# Patient Record
Sex: FEMALE | Race: BLACK OR AFRICAN AMERICAN | Employment: UNEMPLOYED | URBAN - METROPOLITAN AREA
[De-identification: names, ages, dates, MRNs, and addresses within clinical notes are randomized per-mention and may not be internally consistent; named-entity substitution may affect disease eponyms.]

---

## 2020-01-27 ENCOUNTER — MEDICAL CORRESPONDENCE (OUTPATIENT)
Dept: HEALTH INFORMATION MANAGEMENT | Facility: CLINIC | Age: 25
End: 2020-01-27

## 2020-01-27 ENCOUNTER — HOSPITAL ENCOUNTER (INPATIENT)
Facility: CLINIC | Age: 25
LOS: 3 days | Discharge: HOME HEALTH | End: 2020-01-30
Attending: OBSTETRICS & GYNECOLOGY | Admitting: OBSTETRICS & GYNECOLOGY
Payer: COMMERCIAL

## 2020-01-27 PROBLEM — Z36.89 ENCOUNTER FOR TRIAGE IN PREGNANT PATIENT: Status: ACTIVE | Noted: 2020-01-27

## 2020-01-27 LAB
ABO + RH BLD: NORMAL
ABO + RH BLD: NORMAL
ALBUMIN UR-MCNC: 10 MG/DL
APPEARANCE UR: ABNORMAL
BACTERIA #/AREA URNS HPF: ABNORMAL /HPF
BASOPHILS # BLD AUTO: 0 10E9/L (ref 0–0.2)
BASOPHILS NFR BLD AUTO: 0.1 %
BILIRUB UR QL STRIP: NEGATIVE
BLD GP AB SCN SERPL QL: NORMAL
BLOOD BANK CMNT PATIENT-IMP: NORMAL
COLOR UR AUTO: YELLOW
CREAT UR-MCNC: 98 MG/DL
DIFFERENTIAL METHOD BLD: ABNORMAL
EOSINOPHIL # BLD AUTO: 0.2 10E9/L (ref 0–0.7)
EOSINOPHIL NFR BLD AUTO: 1.1 %
ERYTHROCYTE [DISTWIDTH] IN BLOOD BY AUTOMATED COUNT: 12.5 % (ref 10–15)
GLUCOSE UR STRIP-MCNC: NEGATIVE MG/DL
HCT VFR BLD AUTO: 34 % (ref 35–47)
HGB BLD-MCNC: 11.7 G/DL (ref 11.7–15.7)
HGB UR QL STRIP: ABNORMAL
IMM GRANULOCYTES # BLD: 0.1 10E9/L (ref 0–0.4)
IMM GRANULOCYTES NFR BLD: 0.7 %
KETONES UR STRIP-MCNC: NEGATIVE MG/DL
LEUKOCYTE ESTERASE UR QL STRIP: ABNORMAL
LYMPHOCYTES # BLD AUTO: 1.7 10E9/L (ref 0.8–5.3)
LYMPHOCYTES NFR BLD AUTO: 12.4 %
MCH RBC QN AUTO: 34.1 PG (ref 26.5–33)
MCHC RBC AUTO-ENTMCNC: 34.4 G/DL (ref 31.5–36.5)
MCV RBC AUTO: 99 FL (ref 78–100)
MONOCYTES # BLD AUTO: 1.2 10E9/L (ref 0–1.3)
MONOCYTES NFR BLD AUTO: 8.4 %
MUCOUS THREADS #/AREA URNS LPF: PRESENT /LPF
NEUTROPHILS # BLD AUTO: 10.7 10E9/L (ref 1.6–8.3)
NEUTROPHILS NFR BLD AUTO: 77.3 %
NITRATE UR QL: NEGATIVE
NRBC # BLD AUTO: 0 10*3/UL
NRBC BLD AUTO-RTO: 0 /100
PH UR STRIP: 6.5 PH (ref 5–7)
PLATELET # BLD AUTO: 242 10E9/L (ref 150–450)
RBC # BLD AUTO: 3.43 10E12/L (ref 3.8–5.2)
RBC #/AREA URNS AUTO: 3 /HPF (ref 0–2)
RUPTURE OF FETAL MEMBRANES BY ROM PLUS: POSITIVE
SOURCE: ABNORMAL
SP GR UR STRIP: 1.01 (ref 1–1.03)
SPECIMEN EXP DATE BLD: NORMAL
SPECIMEN SOURCE: ABNORMAL
SQUAMOUS #/AREA URNS AUTO: 20 /HPF (ref 0–1)
UROBILINOGEN UR STRIP-MCNC: NORMAL MG/DL (ref 0–2)
WBC # BLD AUTO: 13.9 10E9/L (ref 4–11)
WBC #/AREA URNS AUTO: 10 /HPF (ref 0–5)
WET PREP SPEC: ABNORMAL

## 2020-01-27 PROCEDURE — 86780 TREPONEMA PALLIDUM: CPT | Performed by: STUDENT IN AN ORGANIZED HEALTH CARE EDUCATION/TRAINING PROGRAM

## 2020-01-27 PROCEDURE — 87491 CHLMYD TRACH DNA AMP PROBE: CPT | Performed by: STUDENT IN AN ORGANIZED HEALTH CARE EDUCATION/TRAINING PROGRAM

## 2020-01-27 PROCEDURE — 80349 CANNABINOIDS NATURAL: CPT | Performed by: OBSTETRICS & GYNECOLOGY

## 2020-01-27 PROCEDURE — G0463 HOSPITAL OUTPT CLINIC VISIT: HCPCS

## 2020-01-27 PROCEDURE — 87086 URINE CULTURE/COLONY COUNT: CPT | Performed by: OBSTETRICS & GYNECOLOGY

## 2020-01-27 PROCEDURE — 86901 BLOOD TYPING SEROLOGIC RH(D): CPT | Performed by: STUDENT IN AN ORGANIZED HEALTH CARE EDUCATION/TRAINING PROGRAM

## 2020-01-27 PROCEDURE — G0499 HEPB SCREEN HIGH RISK INDIV: HCPCS | Performed by: STUDENT IN AN ORGANIZED HEALTH CARE EDUCATION/TRAINING PROGRAM

## 2020-01-27 PROCEDURE — 87389 HIV-1 AG W/HIV-1&-2 AB AG IA: CPT | Performed by: STUDENT IN AN ORGANIZED HEALTH CARE EDUCATION/TRAINING PROGRAM

## 2020-01-27 PROCEDURE — 84112 EVAL AMNIOTIC FLUID PROTEIN: CPT | Performed by: OBSTETRICS & GYNECOLOGY

## 2020-01-27 PROCEDURE — 25800030 ZZH RX IP 258 OP 636: Performed by: STUDENT IN AN ORGANIZED HEALTH CARE EDUCATION/TRAINING PROGRAM

## 2020-01-27 PROCEDURE — 87653 STREP B DNA AMP PROBE: CPT | Performed by: STUDENT IN AN ORGANIZED HEALTH CARE EDUCATION/TRAINING PROGRAM

## 2020-01-27 PROCEDURE — 25000128 H RX IP 250 OP 636: Performed by: STUDENT IN AN ORGANIZED HEALTH CARE EDUCATION/TRAINING PROGRAM

## 2020-01-27 PROCEDURE — 81001 URINALYSIS AUTO W/SCOPE: CPT | Performed by: OBSTETRICS & GYNECOLOGY

## 2020-01-27 PROCEDURE — 87210 SMEAR WET MOUNT SALINE/INK: CPT | Performed by: STUDENT IN AN ORGANIZED HEALTH CARE EDUCATION/TRAINING PROGRAM

## 2020-01-27 PROCEDURE — 86900 BLOOD TYPING SEROLOGIC ABO: CPT | Performed by: STUDENT IN AN ORGANIZED HEALTH CARE EDUCATION/TRAINING PROGRAM

## 2020-01-27 PROCEDURE — 80307 DRUG TEST PRSMV CHEM ANLYZR: CPT | Performed by: OBSTETRICS & GYNECOLOGY

## 2020-01-27 PROCEDURE — G0463 HOSPITAL OUTPT CLINIC VISIT: HCPCS | Mod: 25

## 2020-01-27 PROCEDURE — 12000001 ZZH R&B MED SURG/OB UMMC

## 2020-01-27 PROCEDURE — 85025 COMPLETE CBC W/AUTO DIFF WBC: CPT | Performed by: STUDENT IN AN ORGANIZED HEALTH CARE EDUCATION/TRAINING PROGRAM

## 2020-01-27 PROCEDURE — 86850 RBC ANTIBODY SCREEN: CPT | Performed by: STUDENT IN AN ORGANIZED HEALTH CARE EDUCATION/TRAINING PROGRAM

## 2020-01-27 PROCEDURE — 87591 N.GONORRHOEAE DNA AMP PROB: CPT | Performed by: STUDENT IN AN ORGANIZED HEALTH CARE EDUCATION/TRAINING PROGRAM

## 2020-01-27 RX ORDER — ACETAMINOPHEN 325 MG/1
650 TABLET ORAL EVERY 4 HOURS PRN
Status: DISCONTINUED | OUTPATIENT
Start: 2020-01-27 | End: 2020-01-28

## 2020-01-27 RX ORDER — PROCHLORPERAZINE 25 MG
25 SUPPOSITORY, RECTAL RECTAL EVERY 12 HOURS PRN
Status: DISCONTINUED | OUTPATIENT
Start: 2020-01-27 | End: 2020-01-28

## 2020-01-27 RX ORDER — OXYTOCIN/0.9 % SODIUM CHLORIDE 30/500 ML
100-340 PLASTIC BAG, INJECTION (ML) INTRAVENOUS CONTINUOUS PRN
Status: COMPLETED | OUTPATIENT
Start: 2020-01-27 | End: 2020-01-28

## 2020-01-27 RX ORDER — OXYTOCIN/0.9 % SODIUM CHLORIDE 30/500 ML
1-24 PLASTIC BAG, INJECTION (ML) INTRAVENOUS CONTINUOUS
Status: DISCONTINUED | OUTPATIENT
Start: 2020-01-27 | End: 2020-01-28

## 2020-01-27 RX ORDER — LIDOCAINE 40 MG/G
CREAM TOPICAL
Status: DISCONTINUED | OUTPATIENT
Start: 2020-01-27 | End: 2020-01-28

## 2020-01-27 RX ORDER — PRENATAL VIT/IRON FUM/FOLIC AC 27MG-0.8MG
1 TABLET ORAL DAILY
COMMUNITY

## 2020-01-27 RX ORDER — CITRIC ACID/SODIUM CITRATE 334-500MG
30 SOLUTION, ORAL ORAL ONCE
Status: DISCONTINUED | OUTPATIENT
Start: 2020-01-27 | End: 2020-01-28

## 2020-01-27 RX ORDER — PENICILLIN G POTASSIUM 5000000 [IU]/1
5 INJECTION, POWDER, FOR SOLUTION INTRAMUSCULAR; INTRAVENOUS ONCE
Status: COMPLETED | OUTPATIENT
Start: 2020-01-27 | End: 2020-01-27

## 2020-01-27 RX ORDER — SODIUM CHLORIDE, SODIUM LACTATE, POTASSIUM CHLORIDE, CALCIUM CHLORIDE 600; 310; 30; 20 MG/100ML; MG/100ML; MG/100ML; MG/100ML
INJECTION, SOLUTION INTRAVENOUS
Status: DISCONTINUED
Start: 2020-01-27 | End: 2020-01-28 | Stop reason: HOSPADM

## 2020-01-27 RX ORDER — CARBOPROST TROMETHAMINE 250 UG/ML
250 INJECTION, SOLUTION INTRAMUSCULAR
Status: DISCONTINUED | OUTPATIENT
Start: 2020-01-27 | End: 2020-01-28

## 2020-01-27 RX ORDER — OXYTOCIN 10 [USP'U]/ML
10 INJECTION, SOLUTION INTRAMUSCULAR; INTRAVENOUS
Status: DISCONTINUED | OUTPATIENT
Start: 2020-01-27 | End: 2020-01-28

## 2020-01-27 RX ORDER — NALOXONE HYDROCHLORIDE 0.4 MG/ML
.1-.4 INJECTION, SOLUTION INTRAMUSCULAR; INTRAVENOUS; SUBCUTANEOUS
Status: DISCONTINUED | OUTPATIENT
Start: 2020-01-27 | End: 2020-01-28

## 2020-01-27 RX ORDER — IBUPROFEN 800 MG/1
800 TABLET, FILM COATED ORAL
Status: DISCONTINUED | OUTPATIENT
Start: 2020-01-27 | End: 2020-01-28

## 2020-01-27 RX ORDER — SODIUM CHLORIDE, SODIUM LACTATE, POTASSIUM CHLORIDE, CALCIUM CHLORIDE 600; 310; 30; 20 MG/100ML; MG/100ML; MG/100ML; MG/100ML
INJECTION, SOLUTION INTRAVENOUS CONTINUOUS
Status: DISCONTINUED | OUTPATIENT
Start: 2020-01-27 | End: 2020-01-28

## 2020-01-27 RX ORDER — ONDANSETRON 2 MG/ML
4 INJECTION INTRAMUSCULAR; INTRAVENOUS EVERY 6 HOURS PRN
Status: DISCONTINUED | OUTPATIENT
Start: 2020-01-27 | End: 2020-01-28

## 2020-01-27 RX ORDER — OXYCODONE AND ACETAMINOPHEN 5; 325 MG/1; MG/1
1 TABLET ORAL
Status: DISCONTINUED | OUTPATIENT
Start: 2020-01-27 | End: 2020-01-28

## 2020-01-27 RX ORDER — FENTANYL CITRATE 50 UG/ML
50-100 INJECTION, SOLUTION INTRAMUSCULAR; INTRAVENOUS
Status: DISCONTINUED | OUTPATIENT
Start: 2020-01-27 | End: 2020-01-28

## 2020-01-27 RX ORDER — TERBUTALINE SULFATE 1 MG/ML
0.25 INJECTION, SOLUTION SUBCUTANEOUS
Status: DISCONTINUED | OUTPATIENT
Start: 2020-01-27 | End: 2020-01-28

## 2020-01-27 RX ORDER — METHYLERGONOVINE MALEATE 0.2 MG/ML
200 INJECTION INTRAVENOUS
Status: DISCONTINUED | OUTPATIENT
Start: 2020-01-27 | End: 2020-01-28

## 2020-01-27 RX ORDER — METOCLOPRAMIDE HYDROCHLORIDE 5 MG/ML
10 INJECTION INTRAMUSCULAR; INTRAVENOUS EVERY 6 HOURS PRN
Status: DISCONTINUED | OUTPATIENT
Start: 2020-01-27 | End: 2020-01-28

## 2020-01-27 RX ADMIN — SODIUM CHLORIDE, POTASSIUM CHLORIDE, SODIUM LACTATE AND CALCIUM CHLORIDE: 600; 310; 30; 20 INJECTION, SOLUTION INTRAVENOUS at 19:48

## 2020-01-27 RX ADMIN — PENICILLIN G POTASSIUM 5 MILLION UNITS: 5000000 POWDER, FOR SOLUTION INTRAMUSCULAR; INTRAPLEURAL; INTRATHECAL; INTRAVENOUS at 19:58

## 2020-01-27 ASSESSMENT — ACTIVITIES OF DAILY LIVING (ADL)
AMBULATION: 0-->INDEPENDENT
RETIRED_COMMUNICATION: 0-->UNDERSTANDS/COMMUNICATES WITHOUT DIFFICULTY
DRESS: 0-->INDEPENDENT
TOILETING: 0-->INDEPENDENT
RETIRED_EATING: 0-->INDEPENDENT
SWALLOWING: 0-->SWALLOWS FOODS/LIQUIDS WITHOUT DIFFICULTY
TRANSFERRING: 0-->INDEPENDENT
BATHING: 0-->INDEPENDENT
COGNITION: 0 - NO COGNITION ISSUES REPORTED
FALL_HISTORY_WITHIN_LAST_SIX_MONTHS: NO

## 2020-01-27 ASSESSMENT — MIFFLIN-ST. JEOR: SCORE: 1462.35

## 2020-01-28 LAB
BACTERIA SPEC CULT: NORMAL
C TRACH DNA SPEC QL NAA+PROBE: NEGATIVE
GLUCOSE BLDC GLUCOMTR-MCNC: 113 MG/DL (ref 70–99)
GLUCOSE BLDC GLUCOMTR-MCNC: 113 MG/DL (ref 70–99)
GP B STREP DNA SPEC QL NAA+PROBE: NEGATIVE
HBV SURFACE AG SERPL QL IA: NONREACTIVE
HIV 1+2 AB+HIV1 P24 AG SERPL QL IA: NONREACTIVE
Lab: NORMAL
N GONORRHOEA DNA SPEC QL NAA+PROBE: NEGATIVE
SPECIMEN SOURCE: NORMAL

## 2020-01-28 PROCEDURE — 25000132 ZZH RX MED GY IP 250 OP 250 PS 637: Performed by: STUDENT IN AN ORGANIZED HEALTH CARE EDUCATION/TRAINING PROGRAM

## 2020-01-28 PROCEDURE — 72200001 ZZH LABOR CARE VAGINAL DELIVERY SINGLE

## 2020-01-28 PROCEDURE — 88307 TISSUE EXAM BY PATHOLOGIST: CPT | Performed by: STUDENT IN AN ORGANIZED HEALTH CARE EDUCATION/TRAINING PROGRAM

## 2020-01-28 PROCEDURE — 12000001 ZZH R&B MED SURG/OB UMMC

## 2020-01-28 PROCEDURE — 25000128 H RX IP 250 OP 636: Performed by: STUDENT IN AN ORGANIZED HEALTH CARE EDUCATION/TRAINING PROGRAM

## 2020-01-28 PROCEDURE — 00000146 ZZHCL STATISTIC GLUCOSE BY METER IP

## 2020-01-28 PROCEDURE — 25000132 ZZH RX MED GY IP 250 OP 250 PS 637

## 2020-01-28 PROCEDURE — 88307 TISSUE EXAM BY PATHOLOGIST: CPT | Mod: 26 | Performed by: STUDENT IN AN ORGANIZED HEALTH CARE EDUCATION/TRAINING PROGRAM

## 2020-01-28 PROCEDURE — 25000125 ZZHC RX 250: Performed by: STUDENT IN AN ORGANIZED HEALTH CARE EDUCATION/TRAINING PROGRAM

## 2020-01-28 RX ORDER — CARBOPROST TROMETHAMINE 250 UG/ML
250 INJECTION, SOLUTION INTRAMUSCULAR
Status: DISCONTINUED | OUTPATIENT
Start: 2020-01-28 | End: 2020-01-30 | Stop reason: HOSPADM

## 2020-01-28 RX ORDER — AMOXICILLIN 250 MG
2 CAPSULE ORAL 2 TIMES DAILY
Status: DISCONTINUED | OUTPATIENT
Start: 2020-01-28 | End: 2020-01-30 | Stop reason: HOSPADM

## 2020-01-28 RX ORDER — IBUPROFEN 600 MG/1
600 TABLET, FILM COATED ORAL EVERY 6 HOURS PRN
Qty: 60 TABLET | Refills: 0 | Status: SHIPPED | OUTPATIENT
Start: 2020-01-28

## 2020-01-28 RX ORDER — LIDOCAINE HYDROCHLORIDE 10 MG/ML
INJECTION, SOLUTION EPIDURAL; INFILTRATION; INTRACAUDAL; PERINEURAL
Status: DISCONTINUED
Start: 2020-01-28 | End: 2020-01-28 | Stop reason: WASHOUT

## 2020-01-28 RX ORDER — ACETAMINOPHEN 325 MG/1
650 TABLET ORAL EVERY 6 HOURS PRN
Qty: 100 TABLET | Refills: 0 | Status: SHIPPED | OUTPATIENT
Start: 2020-01-28

## 2020-01-28 RX ORDER — ACETAMINOPHEN 325 MG/1
650 TABLET ORAL EVERY 4 HOURS PRN
Status: DISCONTINUED | OUTPATIENT
Start: 2020-01-28 | End: 2020-01-30 | Stop reason: HOSPADM

## 2020-01-28 RX ORDER — AMOXICILLIN 250 MG
1 CAPSULE ORAL DAILY
Qty: 100 TABLET | Refills: 0 | Status: SHIPPED | OUTPATIENT
Start: 2020-01-28

## 2020-01-28 RX ORDER — LANOLIN 100 %
OINTMENT (GRAM) TOPICAL
Status: DISCONTINUED | OUTPATIENT
Start: 2020-01-28 | End: 2020-01-30 | Stop reason: HOSPADM

## 2020-01-28 RX ORDER — IBUPROFEN 800 MG/1
800 TABLET, FILM COATED ORAL EVERY 6 HOURS PRN
Status: DISCONTINUED | OUTPATIENT
Start: 2020-01-28 | End: 2020-01-30 | Stop reason: HOSPADM

## 2020-01-28 RX ORDER — BISACODYL 10 MG
10 SUPPOSITORY, RECTAL RECTAL DAILY PRN
Status: DISCONTINUED | OUTPATIENT
Start: 2020-01-30 | End: 2020-01-30 | Stop reason: HOSPADM

## 2020-01-28 RX ORDER — OXYTOCIN 10 [USP'U]/ML
10 INJECTION, SOLUTION INTRAMUSCULAR; INTRAVENOUS
Status: DISCONTINUED | OUTPATIENT
Start: 2020-01-28 | End: 2020-01-30 | Stop reason: HOSPADM

## 2020-01-28 RX ORDER — CARBOPROST TROMETHAMINE 250 UG/ML
INJECTION, SOLUTION INTRAMUSCULAR
Status: DISCONTINUED
Start: 2020-01-28 | End: 2020-01-28 | Stop reason: WASHOUT

## 2020-01-28 RX ORDER — HYDROCORTISONE 2.5 %
CREAM (GRAM) TOPICAL 3 TIMES DAILY PRN
Status: DISCONTINUED | OUTPATIENT
Start: 2020-01-28 | End: 2020-01-30 | Stop reason: HOSPADM

## 2020-01-28 RX ORDER — OXYTOCIN 10 [USP'U]/ML
INJECTION, SOLUTION INTRAMUSCULAR; INTRAVENOUS
Status: DISCONTINUED
Start: 2020-01-28 | End: 2020-01-28 | Stop reason: WASHOUT

## 2020-01-28 RX ORDER — OXYTOCIN/0.9 % SODIUM CHLORIDE 30/500 ML
100 PLASTIC BAG, INJECTION (ML) INTRAVENOUS CONTINUOUS
Status: DISCONTINUED | OUTPATIENT
Start: 2020-01-28 | End: 2020-01-30 | Stop reason: HOSPADM

## 2020-01-28 RX ORDER — NALOXONE HYDROCHLORIDE 0.4 MG/ML
.1-.4 INJECTION, SOLUTION INTRAMUSCULAR; INTRAVENOUS; SUBCUTANEOUS
Status: DISCONTINUED | OUTPATIENT
Start: 2020-01-28 | End: 2020-01-30 | Stop reason: HOSPADM

## 2020-01-28 RX ORDER — AMOXICILLIN 250 MG
1 CAPSULE ORAL 2 TIMES DAILY
Status: DISCONTINUED | OUTPATIENT
Start: 2020-01-28 | End: 2020-01-30 | Stop reason: HOSPADM

## 2020-01-28 RX ORDER — MISOPROSTOL 200 UG/1
800 TABLET ORAL
Status: DISCONTINUED | OUTPATIENT
Start: 2020-01-28 | End: 2020-01-30 | Stop reason: HOSPADM

## 2020-01-28 RX ORDER — MISOPROSTOL 200 UG/1
TABLET ORAL
Status: COMPLETED
Start: 2020-01-28 | End: 2020-01-28

## 2020-01-28 RX ORDER — OXYTOCIN/0.9 % SODIUM CHLORIDE 30/500 ML
340 PLASTIC BAG, INJECTION (ML) INTRAVENOUS CONTINUOUS PRN
Status: DISCONTINUED | OUTPATIENT
Start: 2020-01-28 | End: 2020-01-30 | Stop reason: HOSPADM

## 2020-01-28 RX ORDER — OXYTOCIN/0.9 % SODIUM CHLORIDE 30/500 ML
PLASTIC BAG, INJECTION (ML) INTRAVENOUS
Status: DISCONTINUED
Start: 2020-01-28 | End: 2020-01-28 | Stop reason: WASHOUT

## 2020-01-28 RX ORDER — METHYLERGONOVINE MALEATE 0.2 MG/ML
200 INJECTION INTRAVENOUS
Status: COMPLETED | OUTPATIENT
Start: 2020-01-28 | End: 2020-01-28

## 2020-01-28 RX ADMIN — IBUPROFEN 800 MG: 800 TABLET, FILM COATED ORAL at 05:00

## 2020-01-28 RX ADMIN — SENNOSIDES AND DOCUSATE SODIUM 1 TABLET: 8.6; 5 TABLET ORAL at 21:12

## 2020-01-28 RX ADMIN — Medication 2.5 MILLION UNITS: at 00:26

## 2020-01-28 RX ADMIN — MISOPROSTOL 200 MCG: 200 TABLET ORAL at 01:45

## 2020-01-28 RX ADMIN — METHYLERGONOVINE MALEATE 400 MCG: 0.2 INJECTION INTRAMUSCULAR; INTRAVENOUS at 01:53

## 2020-01-28 RX ADMIN — Medication 2 MILLI-UNITS/MIN: at 01:09

## 2020-01-28 RX ADMIN — Medication 340 ML/HR: at 01:44

## 2020-01-28 RX ADMIN — IBUPROFEN 800 MG: 800 TABLET, FILM COATED ORAL at 21:21

## 2020-01-28 NOTE — PROVIDER NOTIFICATION
Pitocin, misoprostol and methergine given. FF and plan to increase fundal massages to q5-10 min per Dr. Sherwood.

## 2020-01-28 NOTE — DISCHARGE SUMMARY
[facility] Discharge Summary    Nelly Coyne MRN# [id number]   Age: 24 year old YOB: 1995     Date of Admission:  2020  Date of Discharge::  2020  Admitting Physician:  Mary Sherwood MD  Discharge Physician:  Jaki Carreon MD          Admission Diagnoses:   -IUP at 36w1d  -PPROM,  labor  -GDM status unknown, refused GCT          Discharge Diagnosis:     -IUP at 36w1d, now delivered  Positive hepatitis B surface antigen          Procedures:     Procedure(s): -            Medications Prior to Admission:     Medications Prior to Admission   Medication Sig Dispense Refill Last Dose     Prenatal Vit-Fe Fumarate-FA (PRENATAL MULTIVITAMIN W/IRON) 27-0.8 MG tablet Take 1 tablet by mouth daily   Past Month at Unknown time             Discharge Medications:        Review of your medicines      START taking      Dose / Directions   acetaminophen 325 MG tablet  Commonly known as:  TYLENOL      Dose:  650 mg  Take 2 tablets (650 mg) by mouth every 6 hours as needed for mild pain Start after Delivery.  Quantity:  100 tablet  Refills:  0     ibuprofen 600 MG tablet  Commonly known as:  ADVIL/MOTRIN      Dose:  600 mg  Take 1 tablet (600 mg) by mouth every 6 hours as needed for moderate pain Start after delivery  Quantity:  60 tablet  Refills:  0     senna-docusate 8.6-50 MG tablet  Commonly known as:  SENOKOT-S/PERICOLACE      Dose:  1 tablet  Take 1 tablet by mouth daily Start after delivery.  Quantity:  100 tablet  Refills:  0        CONTINUE these medicines which have NOT CHANGED      Dose / Directions   prenatal multivitamin w/iron 27-0.8 MG tablet      Dose:  1 tablet  Take 1 tablet by mouth daily  Refills:  0           Where to get your medicines      These medications were sent to [facility] [address]  [address] [address]    Phone:  [phone]     acetaminophen 325 MG tablet    ibuprofen 600 MG  tablet    senna-docusate 8.6-50 MG tablet               Consultations:     SW          Brief Admission History   Ms. Nelly Coyne is a 24 year old  at 36w0d by 11w1d US who presents with leaking fluid since  and contractions since today at 1500. Small amount of pink when wiping once today, otherwise no vaginal bleeding.  + normal fetal movement.         Brief Intrapartum Course:   Was found to have ruptured membranes and spontaneous  labor. Induction/augmentation was recommended. Labor was augmented with pitocin. For pain, she received nothing. She progressed with complete and with good effort delivered a female infant from MIGDALIA position. Shoulders delivered easily. No nuchal cord. Placed on mother's chest. Infant was vigorous, so cord was cut after 60 second delay. APGAR of 8 and 9 at 1 and 5 minutes. Weight 2500 g. Placenta delivered with gentle cord traction; noted to be intact with 3 vessel cord. No lacerations were present that required repair. Good hemostasis noted. Poor uterine tone was noted, for which she received IV pitocin, oral misoprostol, and IM methergine with good improvement in tone. Fundus firm and lochia minimal at the end of the case.  mL. Dr. Sherwood was present during entire delivery.            Hospital Course:   The patient's hospital course was unremarkable.  On discharge, her pain was well controlled. Vaginal bleeding is similar to peak menstrual flow.  Voiding without difficulty.  Ambulating well and tolerating a normal diet.  No fever.  Breastfeeding well.  Infant is stable.  She was discharged on post-partum day #1. Hep B surface antigen was reactive but repeat was negative. Hep B core antibody was negative.  Post-partum hemoglobin: 10.4          Discharge Instructions and Follow-Up:     Discharge diet: Regular   Discharge activity: Pelvic rest for 6 weeks including no sexual intercourse, tampons, or douching.    Discharge follow-up: Follow up with your primary  OB for a routine postpartum visit in 6 weeks           Discharge Disposition:     Discharged to home in stable condition     Amy Schumer, MD  Obstetrics & Gynecology, PGY-3  01/30/20      Appreciate note by Dr. Schumer. Patient has been seen and examined by me separate from the resident, agree with above note.     Jaki Carreon MD  10:41 AM

## 2020-01-28 NOTE — PLAN OF CARE
Data: Nelly Coyne transferred to [place] [address] via wheelchair at 0425. Baby transferred via parent's arms.  Action: Receiving unit notified of transfer: Yes. Patient and family notified of room change. Report given to YENIFER Lyman at 0415. Belongings sent to receiving unit. Accompanied by Registered Nurse. Oriented patient to surroundings. Call light within reach. ID bands double-checked with receiving RN.  Response: Patient tolerated transfer and is stable, up ad chanel and voiding independently.

## 2020-01-28 NOTE — PLAN OF CARE
of viable female  @ 0139. NICU present at delivery, GA 36 . APGARs 8,9 brought immediately skin to skin with mom for a few minutes before quick evaluation at warmer.     QBL 100mL with delivery of placenta. Clots expressed. Pitocin IV, misoprostol PO, and methergine IM given. No repair indicated. Continuing to monitor bleeding closely and provide frequent fundal massage.

## 2020-01-28 NOTE — PROVIDER NOTIFICATION
Head visible on perineum with pushing. Dr. Quinones at bedside and Dr. Sherwood and NICU team paged to attend delivery.

## 2020-01-28 NOTE — PLAN OF CARE
VSS, afebrile. Denies chills or abdominal tenderness outside of contractions. Leaking fluid remains clear and odorless. No vaginal bleeding noted. Confirms normal fetal movement. Patient reports contractions increasing in intensity and frequency, plans unmedicated birth at this time. Educated on options to support her comfort. Family and partner present at the bedside. Continue current cares, plan to begin induction around midnight and anticipate .

## 2020-01-28 NOTE — PLAN OF CARE
[facility] with complaint of contractions every 15 minutes and leaking since early Saturday morning, >60 hours ago. Pt is afebrile. Cervix is posterior, but 4/90/-2. Labs pending. Pt moved to Rm 477 after + ROM +. Antibiotics started for PPROM and unknown GBS status. Pt comfortable at this time. She states she would like to deliver without pain meds. Oriented to new room. Call light within reach.

## 2020-01-28 NOTE — L&D DELIVERY NOTE
OB Vaginal Delivery Note    Nelly Coyne MRN# [id number]   Age: 24 year old YOB: 1995     Patient is a 24 year old  at 36w1d by 11w1d US who presented for leaking fluid and contractions. Was found to have ruptured membranes and spontaneous  labor. Induction/augmentation was recommended. Labor was augmented with pitocin. For pain, she received no medications. She progressed to complete cervical dilation and with good effort delivered a liveborn female infant from MIGDALIA position. Shoulders delivered easily. No nuchal cord. Placed on mother's chest. Infant was vigorous, so cord was cut after 60 second delay. APGAR of 8 and 9 at 1 and 5 minutes. Weight 2500 g. Placenta delivered with gentle cord traction; noted to be intact with 3 vessel cord. No lacerations were present that required repair. Good hemostasis noted. Poor uterine tone was noted, for which she received IV pitocin, oral misoprostol, and IM methergine with good improvement in tone. Fundus firm and lochia minimal at the end of the case.  ml. Dr. Sherwood was present during entire delivery.     Isa Quinones MD  OB/GYN PGY-2  20 2:06 AM     I was scrubbed and present for the above , delivery of placenta and management of postdelivery uterine atony.    Mary Sherwood MD, FACOG      GA: 36w1d  GP:   Labor Complications: None   EBL:    mL  Delivery QBL:    Delivery Type: Vaginal, Spontaneous   ROM to Delivery Time: (Delivered) Days: 3 Minutes: 39   Weight:      1 Minute 5 Minute 10 Minute   Apgar Totals: 8    9                Delivery Details:  Nelly Coyne, a 24 year old  female delivered a viable infant with apgars of 8   and 9  . Patient was fully dilated and pushing after    hours    minutes in active labor. Delivery was via vaginal, spontaneous  to a sterile field under none  anesthesia. Infant delivered in vertex  right  occiput  anterior  position. Anterior and posterior shoulders delivered  without difficulty. The cord was clamped, cut twice and 3 vessels  were noted. Cord blood was obtained in routine fashion with the following disposition: lab .      Cord complications: none   Placenta delivered at   . Placental disposition was Pathology . Fundal massage performed and fundus found to be firm.     Episiotomy: none    Perineum, vagina, cervix were inspected, and the following lacerations were noted:   Perineal lacerations: none                       Excellent hemostasis was noted. Needle count correct. Infant and patient in delivery room in good and stable condition.        Labor Events     labor?:  Yes   steroids:  None  Labor Type:  Spontaneous  Predominate monitoring during 1st stage:  continuous electronic fetal monitoring     Antibiotics received during labor?:  Yes  Reason for Antibiotics:  GBS  Antibiotics received for GBS:  Penicillin  Antibiotics Given (GBS):  Greater than 4 hours prior to delivery     Rupture date/time: 20 0100   Rupture type:  Spontaneous rupture of membranes prior to induction  Fluid color:  Clear  Fluid odor:  Normal     Delivery/Placenta Date and Time    Delivery Date:  20 Delivery Time:   1:39 AM      Apgars    Living status:  Living   1 Minute 5 Minute 10 Minute 15 Minute 20 Minute   Skin color: 0  1       Heart rate: 2  2       Reflex irritability: 2  2       Muscle tone: 2  2       Respiratory effort: 2  2       Total: 8  9       Apgars assigned by:  AUGUSTIN TAM     Cord    Vessels:  3 Vessels Complications:  None   Cord Blood Disposition:  Lab Gases Sent?:  No      [place] Resuscitation    Methods:  Suctioning   Care at Delivery:  Called by Dr. Quinones to attend delivery secondary to 36 1/7 week gestation. Infant delivered at 0139 on 20 and cried at delivery. Cord clamping delayed x 1 minute, per routine. Infant then taken to warmer, dried, stimulated, and bulb suctioned. Apgars 8 at 1 minute, 9 at 5 minutes. Physical  exam significant for supernumerary digits (pinky fingers), bilaterally.    HERACLIO Perkins 1/28/2020 1:57 AM         Labor Events and Shoulder Dystocia    Fetal Tracing Prior to Delivery:  Category 1  Shoulder dystocia present?:  Neg     Delivery (Maternal) (Provider to Complete) ([id number])    Episiotomy:  None  Perineal lacerations:  None       Blood Loss  Mother: Nelly Coyne #[id number]   Start of Mother's Information    IO Blood Loss  01/27/20 1339 - 01/28/20 0208    None           End of Mother's Information  Mother: Nelly Coyne #[id number]         Delivery - Provider to Complete (224780)    Delivery Type (Choose the 1 that will go to the Birth History):  Vaginal, Spontaneous          Placenta    Delayed Cord Clamping:  Done  Removal:  Spontaneous  Disposition:  Pathology     Anesthesia    Method:  None          Presentation and Position    Presentation:  Vertex  Position:  Right Occiput Anterior           Isa Quinones MD

## 2020-01-28 NOTE — PROGRESS NOTES
"Labor progress note    S: Patient increasingly uncomfortable with contractions. Family in room and supportive.     O:  Patient Vitals for the past 24 hrs:   BP Temp Temp src Resp Height Weight   20 2300 -- 97.9  F (36.6  C) Oral -- -- --   20 2200 -- 98.2  F (36.8  C) Oral -- -- --   20 2030 -- 98.3  F (36.8  C) Oral 18 -- --   20 1753 116/61 98  F (36.7  C) Oral 20 -- --   20 1752 116/61 97.9  F (36.6  C) Oral 20 1.562 m (5' 1.5\") 76.2 kg (168 lb)     Gen: Uncomfortable with ctx  SVE: /-2    FHT baseline 145, mod arti, accels present, no decels  [place]: 1-2 contractions in 10 minutes     Assessment  Ms. Nelly Coyne is a 24 year old , at 36w0d by 11w1d US who presents with PPROM and latent labor.     Plan  Labor:   Anticipate , will augment with pitocin as SVE is unchanged and now adequate on GBS ppx  FWB:    Category I FHT.  Continue EFM and toco  Pain:     Desires nitrous or fentanyl for analgesia  PNC:     Rh pos, Rubella imm, GBS unknown, PCN adequate now, GCT not done, will check random BG, Placenta posterior    Isa Quinones MD  OB/GYN PGY-2  20 12:10 AM     "

## 2020-01-28 NOTE — PLAN OF CARE
Vital signs and assessment findings normal, though max temp of 99. Pt fundus firm, bleeding scant. Denies pain but agreed to ibuprofen. Pitocin saline locked at 0500. Breastfeeding with assist, though able to express good drops of colostrum. LC put in due to LPT status of baby. Patient arrived with mother and significant other and supported by significant other Demylo overnight. Initial room orientation complete. EDS given and encouraged to complete as well with birth certificate. Continue to monitor.

## 2020-01-28 NOTE — LACTATION NOTE
This note was copied from a baby's chart.  Consult for: First time mom breastfeeding, late  infant 5.5#.    History: Vaginal delivery @ 36w1d, AGA infant @ 5# 8.2 oz. birthweight, BG so far WNL, less than 24 hours at time of visit. Maternal history of fragmented prenatal care, refused GCT,  delivery after SROM.     Breast exam of mom: Soft, symmetric with intact, everted nipples bilaterally. Nelly reports early tenderness & bilateral breast growth during pregnancy.     Feeding assessment:  Attempted feeding this morning, infant sleepy and disinterested. Encouraged mom to attempt for 5 minutes or so but if sleepy, ok to just give supplement and began pumping.     Education provided: Discussed potential feeding challenges of and ways to support LPT infants including benefits of extra supplements, rationale for nipple shield use initially and importance of LC follow up outpatient. Reviewed positioning & using pillows/blankets for support, anatomy of breast and infant mouth for feedings, how to latch well using nipple shield & use breast compressions to enhance milk transfer. Encouraged frequent skin to skin and feeding on cue but no longer than 3 hours between, limit feedings to 15-20 minutes if actively feeding, attempts up to 5 minutes if she's sleepy and not interested. Reviewed supply and demand and importance of pumping to bring in full supply, benefits of and how to do breast massage & hands on pumping. Encouraged spoon or finger feeding for now, taught how to do paced bottle feeding with slow flow nipple for use when needed.     Plan:  Please encourage frequent skin to skin, feeding on cue but no longer than 3 hours between (goal of 8 to 12 total feeds in 24 hours). Encourage using nipple shield to maximize milk transfer at breast. Limit time at breast to 15-20 minutes, leaving time for pumping & help conserve baby's energy. Breast massage/compressions while feeding will help maximize milk  transfer. Supplement at least every 3 hours according to guidelines, feed to satiety. Hands on pumping &/or hand express after each feeding, goal of pumping at least 6-8 times in 24 hours to help maximize supply.      Plan for follow up with outpatient lactation consultant within a week of discharge for support with LPT infant, help to monitor infant weight and milk transfer, establish supply & eventually wean from pumping and nipple shield. Due to infant prematurity, recommend renting hospital grade breast pump at discharge to help bring in full milk supply.    Supplement Guidelines for infants <37 weeks gestation or < 6 lbs at birth per the [place] Fe[place] Methods for the  Infant (35-42 weeks) Policy ([facility] Guidelines):  Infants <37 weeks OR <6 pounds   Birth-24 hours of age: 5ml (1 tsp) every 2 - 3 hours, at least 8 times in 24 hours   24-48 hours of age: 10 ml (2 tsp) every 2 - 3 hours, at least 8 times in 24 hours   48-72 hours of age: 15 ml (3 tsp) every 2 - 3 hours, at least 8 times in 24 hours

## 2020-01-28 NOTE — PROVIDER NOTIFICATION
The following page was sent to Dr. Quinones.     FF and scant bleeding with last few checks. She is ready to eat something. Let me know if glucose monitoring plan will change postpartum. Thanks!    Page returned with plan to check fasting BG on PP day 1.

## 2020-01-28 NOTE — H&P
[facility]  OB History and Physical      Nelly Coyne MRN# [id number]   Age: 24 year old YOB: 1995     CC:  Leaking fluid, contraction     HPI:  Ms. Nelly Coyne is a 24 year old  at 36w0d by 11w1d US who presents with leaking fluid since  and contractions since today at 1500. Small amount of pink when wiping once today, otherwise no vaginal bleeding.  + normal fetal movement.    Pregnancy Complications:  - Fragmented prenatal care  - Refused GCT    Prenatal Labs:   Rh pos, Ab screen neg  UCx neg  8/15/19 pap NILM  Hgb 12.5, plt 296  RPR neg  GC/CT neg  HIV NR  HBsAg positive 8/15/19, then on recheck 10/16/19  Rubella imm    GBS Status:   Unknown    Ultrasounds  19 CONCLUSIONS:  Findings consistent with previous dating  EGA by LMP 19: 96cgc8l. TAMEKA:20  EGA by this U/S: 59hzi3u (+/- 7days): TAMEKA by this  U/S: 2/24/20    10/22/19   2nd/3rd Trimester Ultrasound Report    Date of Service: 10/22/2019     CONCLUSIONS:     EGA by LMP(20):22w1d   Estimated Date of Delivery: 20   EGA by this U/S: 30xxr8m (+/- 7days):   TAMEKA by this  U/S: 3/1/20              EFW 426g, +/- 64g, 0lb 15oz    Weight Percentile: 26.2%sona Gracia    PCP: Roxi Jennings MD  Sonographer: Anjana King    Indications: Fetal survey  LMP: Patient's last menstrual period was 2019 (within weeks).   G/P:     FHR: 154 BPM    QUINTIN: 12.67cm  Placenta Location: posterior, is not low lying.  Fetal number:1    Presentation: Cephalic  Technique: Transabdominal  Machine: [employer] P9    FETAL SURVEY                    Cerebellum                         Intracranial Anatomy           normal   (Choroid Plexus, Cisterna Magna,   Lateral ventricles, Cerebellum,   Midline Falx, Cavum Septi Pellucidi)        Heart: (4 chamber)    LT EIF  Heart:(out flow tracts)       normal  Diaphragm                             normal  Stomach                                  normal  Kidney: Right                       normal                 Left                         normal  Umbilical Cord: 3 vessels:   normal                              Insertion:   normal  Bladder                                  normal  Spine: Cervical                      normal              Thoracic                     normal               L/S                              normal  4 limbs visualized                  normal    Sex Determination: female--Gender reveal    MEASUREMENTS:([place])  BPD:  5.18 cm    21wks 5d    HC:    19.34 cm    21wks 4d     AC:    15.95 cm    21wks 0d   FL:     3.73 cm    21wks 6d               **Fetal anomalies may be present but not detected.**    Anjana King RDMS, RVT    21w 2d by measurements today consistent with 1st trimester dates. Complete fetal anatomic survey done including intracranial anatomy, cerebellum, 4 chamber heart, stomach, bladder, diaphragm, kidneys, cord insertion, 3 vessel cord, cervical, thoracic and lumbar spine, no abnormalities seen. Placenta is posterior and not low lying.      OB History  OB History    Para Term  AB Living   1 0 0 0 0 0   SAB TAB Ectopic Multiple Live Births   0 0 0 0 0      # Outcome Date GA Lbr Masoud/2nd Weight Sex Delivery Anes PTL Lv   1 Current                PMHx: negative  History reviewed. No pertinent past medical history.  PSHx: negative  History reviewed. No pertinent surgical history.  Meds:   Medications Prior to Admission   Medication Sig Dispense Refill Last Dose     Prenatal Vit-Fe Fumarate-FA (PRENATAL MULTIVITAMIN W/IRON) 27-0.8 MG tablet Take 1 tablet by mouth daily   Past Month at Unknown time     Allergies:  No Known Allergies   FmHx: History reviewed. No pertinent family history.  SocHx: She denies any tobacco, alcohol, or other drug use during this pregnancy.    ROS:   Complete 10-point ROS negative except as noted in HPI. She denies headache, blurry vision, chest pain, shortness of breath, RUQ  "pain, nausea, vomiting, dysuria, hematuria or extremity edema.    PE:  Vit:   Patient Vitals for the past 4 hrs:   BP Temp Temp src Resp Height Weight   20 1753 116/61 98  F (36.7  C) Oral 20 -- --   20 1752 116/61 97.9  F (36.6  C) Oral 20 1.562 m (5' 1.5\") 76.2 kg (168 lb)      Gen: Well-appearing, NAD, comfortable   CV: rrr, no mrg   Pulm: Ctab, no wheezes or crackles  Abd: Soft, gravid, non-tender  Ext: no LE edema b/l  Cx: /-2    Pres:  ceph by BSUS  EFW:  5.5# by Leopold's  Memb: Grossly ruptured              FHT: Baseline 135, mod variability, + accelerations, no decelerations   [place]: 0-1 contractions in 10 minutes      Assessment  Ms. Nelly Coyne is a 24 year old , at 36w0d by 11w1d US who presents with PPROM and latent labor.    Plan  Admit to L&D  Labor: Anticipate , will augment with pitocin  FWB: Category I FHT.  Continue EFM and toco  Pain: Desires nitrous or fentanyl for analgesia  PNC: Rh pos, Rubella imm, GBS unknown, start PCN now, GCT not done, will check random BG, Placenta posterior    The patient was discussed with Dr. Sherwood who is in agreement with the treatment plan.    Isa Quinones MD PGY2  Department of OB/GYN  2020 7:19 PM    The patient was seen and evaluated by me.  I have reviewed and agree with the above note.    Mary Sherwood MD, FACOG    "

## 2020-01-28 NOTE — PROGRESS NOTES
Patient arrived to [facility] unit via wheelchair at 0435,with belongings, accompanied by family, with infant in arms. Received report from Sugey PULLIAM RN   and checked bands. Unit and room orientation started. Call light given; no concerns present at this time. Continue with plan of care.

## 2020-01-28 NOTE — PLAN OF CARE
Data: Patient presented to [facility] at 1718. Reason for maternal/fetal assessment per patient is Rule Out Labor  Patient is a .       OB History    Para Term  AB Living   1 0 0 0 0 0   SAB TAB Ectopic Multiple Live Births   0 0 0 0 0      # Outcome Date GA Lbr Masoud/2nd Weight Sex Delivery Anes PTL Lv   1 Current            Gestational Age 36w0d. VSS. Fetal movement present. Patient denies pelvic pressure, UTI symptoms, GI problems, bloody show, vaginal bleeding, edema, headache, visual disturbances, epigastric or URQ pain. Support persons boyfriend present.  Action: Verbal consent for EFM. Triage assessment completed. EFM applied for monitoring. Uterine assessment occas ctxs. Fetal assessment: Presumed adequate fetal oxygenation documented (see flow record). ROM PLUS taken and positive. UA sent.   Response: Dr. Quinones informed of pt arrival and status. Plan per provider is admission Patient verbalized agreement with plan.

## 2020-01-28 NOTE — PROGRESS NOTES
TOCO replaced d/t difficulty tracing ctx. Using cole to indicate start of ctx on strip, RN at bedside evaluating uterine activity. US also not tracing FHT during contractions. Recovery to baseline @0113 indicates possible decels with contractions, but unable to determine type.

## 2020-01-28 NOTE — PROVIDER NOTIFICATION
Dr. Quinones at bedside to discuss internal monitoring d/t difficulty tracing FHT during ctx. Nelly reporting continued rectal pressure. SVE performed, found to be complete, will set up for delivery and start pushing.

## 2020-01-29 LAB
AMPHETAMINES UR QL SCN: NEGATIVE
CANNABINOIDS UR CFM-MCNC: 21 NG/ML
CANNABINOIDS UR QL: ABNORMAL
CARBOXYTHC/CREAT UR: 21 NG/MG{CREAT}
COCAINE UR QL: NEGATIVE
GLUCOSE BLDC GLUCOMTR-MCNC: 88 MG/DL (ref 70–99)
HBV CORE AB SERPL QL IA: NONREACTIVE
HGB BLD-MCNC: 10.4 G/DL (ref 11.7–15.7)
OPIATES UR QL SCN: NEGATIVE
PCP UR QL SCN: NEGATIVE
T PALLIDUM AB SER QL: NONREACTIVE

## 2020-01-29 PROCEDURE — 25000132 ZZH RX MED GY IP 250 OP 250 PS 637: Performed by: STUDENT IN AN ORGANIZED HEALTH CARE EDUCATION/TRAINING PROGRAM

## 2020-01-29 PROCEDURE — 00000146 ZZHCL STATISTIC GLUCOSE BY METER IP

## 2020-01-29 PROCEDURE — 85018 HEMOGLOBIN: CPT | Performed by: STUDENT IN AN ORGANIZED HEALTH CARE EDUCATION/TRAINING PROGRAM

## 2020-01-29 PROCEDURE — 36415 COLL VENOUS BLD VENIPUNCTURE: CPT | Performed by: STUDENT IN AN ORGANIZED HEALTH CARE EDUCATION/TRAINING PROGRAM

## 2020-01-29 PROCEDURE — 86704 HEP B CORE ANTIBODY TOTAL: CPT | Performed by: STUDENT IN AN ORGANIZED HEALTH CARE EDUCATION/TRAINING PROGRAM

## 2020-01-29 PROCEDURE — 12000001 ZZH R&B MED SURG/OB UMMC

## 2020-01-29 RX ADMIN — SENNOSIDES AND DOCUSATE SODIUM 2 TABLET: 8.6; 5 TABLET ORAL at 07:57

## 2020-01-29 RX ADMIN — ACETAMINOPHEN 650 MG: 325 TABLET, FILM COATED ORAL at 00:23

## 2020-01-29 RX ADMIN — ACETAMINOPHEN 650 MG: 325 TABLET, FILM COATED ORAL at 05:21

## 2020-01-29 RX ADMIN — IBUPROFEN 800 MG: 800 TABLET, FILM COATED ORAL at 05:21

## 2020-01-29 RX ADMIN — IBUPROFEN 800 MG: 800 TABLET, FILM COATED ORAL at 17:41

## 2020-01-29 RX ADMIN — SENNOSIDES AND DOCUSATE SODIUM 2 TABLET: 8.6; 5 TABLET ORAL at 21:04

## 2020-01-29 RX ADMIN — ACETAMINOPHEN 650 MG: 325 TABLET, FILM COATED ORAL at 17:40

## 2020-01-29 NOTE — PLAN OF CARE
Data: Vital signs within normal limits. Postpartum checks within normal limits - see flow record. Patient eating and drinking normally. Patient able to empty bladder independently and is up ambulating. No apparent signs of infection. perineum healing well. Patient performing self cares and is able to care for infant.  Action: Patient medicated during the shift for cramping, denies pain. See MAR. Patient reassessed within 1 hour after each medication and pain was improved - patient stated she was comfortable. Patient education done about self care and pain management. See flow record.  Response: Positive attachment behaviors observed with infant. Support persons family and spouse present.   Plan: Anticipate discharge on 1/30.

## 2020-01-29 NOTE — PLAN OF CARE
VSS, postpartum assessments WNL. She is up ad chanel, voiding, independent with self care. Reports minimal pain and has not wanted to take any pain medications today when offered. Writer encouraged Pt to take a bath today, aromatherapy salts brought in. Pt needs encouragement to pump, is pumping about 20mL. No concerns. Continue with plan of care.

## 2020-01-29 NOTE — PLAN OF CARE
Denies pain.  VSS.  Showered this afternoon.  Breastfeeding with moderate assist with latch, hand expressing good drops of colostrum and pumping.  Lochia scant.  Very tired and slept between cares.  Continue to monitor.

## 2020-01-29 NOTE — PLAN OF CARE
Patient vital signs stable. Postpartum assessment within normal limits. Pain managed with tylenol and ibuprofen. Up ad chanel. Breastfeeding with assistance. Declines nipple shield use and pumping overnight. Supplementing formula. Performing self cares and infant cares. Provided education on co-sleeping. Fast blood sugar 88. Continue with plan of care

## 2020-01-29 NOTE — CONSULTS
"[facility]  MATERNAL CHILD HEALTH - PSYCHOSOCIAL ASSESSMENT     DATA:     Presenting Information: Nelly is a 25 yo  who delivered her baby, Sigrid, on 2020.  SW received consult for positive THC on admission and met with pt at bedside in [facility].  By her choice, her mom, Petrona, was on video call throughout assessment.    Living Situation: Nelly resides with her mother and 6 of her 7 younger siblings in [place], [place].    Social Support: Nelly reported having a robust family support network.  She described feeling like she will have lots of help with her baby at home, and FOBKacie, is also involved and plans to parent with her.    Education and Employment: Nelly very recently graduated from [employer].  She intends to initially stay at home with her baby, and start looking for employment in about 6 months.  She reported that she feels confident that she will be able to find a job with her education.    Finances and Insurance: Nelly has [employer] [place], and Petrona reported being aware to call and add baby to the plan.  MARY offered information on benefits through the [facility] such as MFIP,  Assistance, and WIC.  Petrona stated that she is aware of how to help support pt with applying to these benefits as needed and denied any additional support.    Mental Health History and Current Coping: Nelly denied any kind of MH history or current concerns.  She described her mood during her pregnancy as \"up and down\" but considered it normal and expected.  She described feeling both nervous and happy about taking her baby home.    Chemical Health History and Current Coping: Nelly denied any chemical dependency or use.  When discussing her positive utox for Cannabinoids, she did not explicitly admit to use, but commenting on being surprised that it could still be in her system.  She denied any concerns related to her use or need for resources.  MARY explained process for " mandated reporting, and expected follow-up from CPS.  She verbalized understanding and was encouraged to reach out to SW if further questions arise.    Community Resources//Baby Supplies: Nelly stated that she feels well prepared for baby at home and has the necessary items including a place for her to sleep and a car seat.  Diapers were identified as a current need, and SW provided donation.    INTERVENTION:       SW completed chart review and psychosocial assessment.    Introduction to Maternal Child Health  role and direct contact information.    Reviewed Hospital and Community Resources.    Provided information on [facility] and [facility]; pt declined SW initiating referrals.    Assessed Chemical Health History and Current Symptoms.    Completed verbal and written mandated report to [facility] CPS (P: [phone], F: [phone]) with Anabel.    Assessed Mental Health History and Current Symptoms.    Identified stressors, barriers and family concerns.    Provided diapers/wipes from [facility] as these need identified.    Provided psychoeducation on  mood and anxiety disorders, assessed for any current symptoms or history.    Provided information on support available through [facility] of [place].    ASSESSMENT:     Nelly was in a pleasant mood, make appropriate eye contact, and addressed SW questions.  She was holding her baby during assessment, and positive attachment behaviors observed.  She appears happy about being a mother, and was able to process the stress of seeing her baby during procedures and tests earlier today.  She appears to have stable and reliable support from her mother who also participated in conversation, and overall has a family support system involved in her life.  No further concerns or needs indicated today, and Nelly was encouraged to reach out to SW as needed.    PLAN:     SW will continue to follow throughout pt's  admission and will remain available as needed.    Tara Wilkes, [facility]  Clinical   Maternal Child Health  Phone: [phone]  Pager: [phone]

## 2020-01-29 NOTE — LACTATION NOTE
This note was copied from a baby's chart.  Follow up visit, Nelly reports breastfeeding going much better today she is waking up and taking the breast now besides the bottle. Mom declines to use the nipple shield, encouraged her to be sure to offer liberal supplements and pump then every time after feedings, supply will rely mainly on pumping to come in.     Sigrid had high risk bili at 26 hours, weight loss 4% from birthweight, blood sugars all WNL. Double bank phototherapy planned for today.    Arrived just after breastfeeding, did not get to see latch. Mom reports went well and she's just not hungry for bottle. Offer and mom allow LC to attempt bottle feed, baby pulling lips in disinterested. Encourage mom to try again with supplement after hearing screen completed (in progress during visit). Nelly did not pump overnight, reinforced importance of pumping to bring milk in and encourage after each feeding especially if not using shield. She pumped during visit, right nipple rubbing in pump tunnel of flange so switched to 27 mm flange that side with improved comfort. She elicited 20 mL breastmilk this time, excited about amount. Encouraged to feed to satiety, offer 10 mL first via bottle then add additional 5 mL at a time if Sigrid takes it all. Reviewed breastfeeding resource list, they plan follow up at [facility]. Nelly plans to use [facility] for LC support, encouraged visit within first week at home. She will call insurance today to check on pump coverage, encouraging rental pump initially so she will ask whether they cover both rental and home pumps.

## 2020-01-29 NOTE — PROGRESS NOTES
Post Partum Progress Note  PPD#1    Subjective:  She is resting comfortably in bed this morning. No complaints Pain is improving and well controlled on current medication regimen. She is tolerating PO intake. Lochia present and similar to a period.  She is voiding without difficulty. She has passed flatus and has not had a BM. She is ambulating without dizziness or difficulty.  She denies headache, changes in vision, nausea/vomiting, chest pain, shortness of breath, RUQ pain, or worsening edema.  Plans to breast and bottle feed. Feeling well enough to go home today.    Objective:  Vitals:    20 0900 20 1600 20 0000 20 0756   BP: 112/67 101/66 102/62 111/69   Pulse: 91 101     Resp: 18 18 18 17   Temp: 98.6  F (37  C) 98.3  F (36.8  C) 98.3  F (36.8  C) 98.3  F (36.8  C)   TempSrc: Oral Oral Oral Oral   Weight:       Height:           General: NAD, resting comfortably  CV: Regular rate, well perfused.   Pulm: Normal respiratory effort.  Abd: Soft, non-tender, non-distended. Fundus is firm and 1 cm below the umbilicus.    Ext: Trace lower extremity edema bilaterally. No calf tenderness.    Assessment/Plan:  Nelly Coyne is a 24 year old  female who is PPD#1 s/p .    - Encourage routine post-operative goals including ambulation and incentive spirometry  - PNC: Rh positive.  Declines vaccines  - Pain: controlled on oral medications  - Heme: Hgb 11.7>>10.4  - GI: continue anti-emetics and stool softeners as needed.  - : Voiding spontaneously.  - Infant: Stable in room  - Feeding: Plans on breast and bottlefeeding.  - BC: Plans on condoms    Hep B sAg reactive  - Repeat negative. Hep Bc Ag/Ab in process    THC Utox positive  - SW consulted    Discharge to home on  PPD#1      Tana Duarte MD, MD  Obstetrics and Gyncology, PGY-2  2020 , 11:42 AM     [facility] staff:  Appreciate note by Dr. Duarte.  I have seen and examined the patient without  the resident. I have reviewed, edited, and agree with the note.        Idania Stein MD, FACOG

## 2020-01-30 VITALS
RESPIRATION RATE: 18 BRPM | DIASTOLIC BLOOD PRESSURE: 69 MMHG | WEIGHT: 168 LBS | BODY MASS INDEX: 30.91 KG/M2 | HEART RATE: 83 BPM | TEMPERATURE: 98.3 F | HEIGHT: 62 IN | SYSTOLIC BLOOD PRESSURE: 97 MMHG

## 2020-01-30 PROCEDURE — 25000132 ZZH RX MED GY IP 250 OP 250 PS 637: Performed by: STUDENT IN AN ORGANIZED HEALTH CARE EDUCATION/TRAINING PROGRAM

## 2020-01-30 RX ADMIN — IBUPROFEN 800 MG: 800 TABLET, FILM COATED ORAL at 00:09

## 2020-01-30 RX ADMIN — IBUPROFEN 800 MG: 800 TABLET, FILM COATED ORAL at 07:00

## 2020-01-30 RX ADMIN — ACETAMINOPHEN 650 MG: 325 TABLET, FILM COATED ORAL at 04:29

## 2020-01-30 RX ADMIN — ACETAMINOPHEN 650 MG: 325 TABLET, FILM COATED ORAL at 08:41

## 2020-01-30 RX ADMIN — SENNOSIDES AND DOCUSATE SODIUM 2 TABLET: 8.6; 5 TABLET ORAL at 08:41

## 2020-01-30 RX ADMIN — ACETAMINOPHEN 650 MG: 325 TABLET, FILM COATED ORAL at 00:09

## 2020-01-30 NOTE — PLAN OF CARE
PT vital signs and assessment findings normal. Pt up ad chanel and able to care for self and baby. Pain managed with tylenol and ibuprofen. Bleeding scant fundus firm. Breastfeeding with assist in keeping baby awake and abtaining deep latch. Declines use of nipple shield. Educated on rationale for nipple shield and shortened feedings at the breast prior to supplementation of maternal milk. Pt bonding well with baby. Education on safe sleep reinforced and encouraged to complete EDS and birth certificate. Continue to monitor.

## 2020-01-30 NOTE — PLAN OF CARE
Data: Vital signs within normal limits. Postpartum checks within normal limits - see flow record. Patient eating and drinking normally. Patient able to empty bladder independently and is up ambulating. No apparent signs of infection. Perineum  healing well. Patient performing self cares and is able to care for infant.  Action: Patient medicated during the shift for pain and cramping. See MAR. Patient reassessed within 1 hour after each medication and pain was improved - patient stated she was comfortable. Patient education done about breastfeeding assistance, pumping frequency, discharge goals. See flow record.  Response: Positive attachment behaviors observed with infant. Support persons none present.   Plan: Anticipate discharge on tomorrow.

## 2020-01-30 NOTE — PLAN OF CARE
Data: Vital signs within normal limits. Postpartum checks within normal limits - see flow record. Patient eating and drinking normally. Patient able to empty bladder independently and is up ambulating. No apparent signs of infection. Perineum  healing well. Patient performing self cares and is able to care for infant.  Action: Patient medicated during the shift for pain. See MAR. Patient reassessed within 1 hour after each medication and pain was improved - patient stated she was comfortable. Patient education done about activity, home medications. See flow record.  Response: Positive attachment behaviors observed with infant. Support persons none present.   Plan: Anticipate discharge today.

## 2020-01-30 NOTE — PROGRESS NOTES
Post Partum Progress Note  PPD#2    Subjective:  She is resting comfortably in bed this morning. No complaints. Pain is well controlled on current medication regimen. She is tolerating PO intake. Lochia present and similar to a period.  She is voiding without difficulty. She is ambulating without dizziness or difficulty.  Plans to breast and bottle feed. Feeling well enough to go home today.    Objective:  Vitals:    20 0000 20 0756 20 1800 20 0035   BP: 102/62 111/69 92/57 104/64   Pulse:   83    Resp: 18 17 18 16   Temp: 98.3  F (36.8  C) 98.3  F (36.8  C) 97.7  F (36.5  C) 98.2  F (36.8  C)   TempSrc: Oral Oral Oral Oral   Weight:       Height:           General: NAD, resting comfortably  CV: Regular rate, well perfused.   Pulm: Normal respiratory effort.  Abd: Soft, non-tender, non-distended. Fundus is firm and 1 cm below the umbilicus.    Ext: Trace lower extremity edema bilaterally. No calf tenderness.    Assessment/Plan:  Nelly Coyne is a 24 year old  female who is PPD#2 s/p .    - Encourage routine post-operative goals including ambulation and incentive spirometry  - PNC: Rh positive.  Declines vaccines  - Pain: controlled on oral medications  - Heme: Hgb 11.7>>10.4  - GI: continue anti-emetics and stool softeners as needed.  - : Voiding spontaneously.  - Infant: Stable in room  - Feeding: Plans on breast and bottlefeeding.  - BC: Plans on condoms    Hep B sAg reactive  - Repeat surface antigen was negative. Hep Bc Ab negative      THC Utox positive  - s/p SW consulted    Discharge to home today    Amy Schumer, MD  Obstetrics and Gynecology, PGY-3  20 6:52 AM       Appreciate note by Dr. Schumer. Patient has been seen and examined by me separate from the resident, agree with above note. Reviewed discharge instructions.     Jaki Carreon MD  10:41 AM

## 2020-02-02 LAB — COPATH REPORT: NORMAL

## 2025-07-31 ENCOUNTER — TRANSCRIBE ORDERS (OUTPATIENT)
Dept: OTHER | Age: 30
End: 2025-07-31

## 2025-07-31 DIAGNOSIS — N62 LARGE BREASTS: Primary | ICD-10-CM

## 2025-08-04 ENCOUNTER — PATIENT OUTREACH (OUTPATIENT)
Dept: CARE COORDINATION | Facility: CLINIC | Age: 30
End: 2025-08-04

## 2025-11-07 ENCOUNTER — PRE VISIT (OUTPATIENT)
Dept: PLASTIC SURGERY | Facility: CLINIC | Age: 30
End: 2025-11-07